# Patient Record
Sex: MALE | Race: WHITE | Employment: FULL TIME | URBAN - METROPOLITAN AREA
[De-identification: names, ages, dates, MRNs, and addresses within clinical notes are randomized per-mention and may not be internally consistent; named-entity substitution may affect disease eponyms.]

---

## 2019-09-23 PROBLEM — E66.01 CLASS 3 SEVERE OBESITY DUE TO EXCESS CALORIES WITH BODY MASS INDEX (BMI) GREATER THAN OR EQUAL TO 70 IN ADULT (HCC): Status: ACTIVE | Noted: 2019-09-23

## 2019-09-23 PROBLEM — K80.50 BILIARY COLIC: Status: ACTIVE | Noted: 2019-09-23

## 2020-11-05 PROBLEM — I60.9 SUBARACHNOID BLEED (HCC): Status: ACTIVE | Noted: 2020-11-05

## 2020-11-05 PROBLEM — R51.9 INTRACTABLE HEADACHE: Status: ACTIVE | Noted: 2020-11-05

## 2021-05-19 ENCOUNTER — OFFICE VISIT (OUTPATIENT)
Dept: SURGERY | Age: 41
End: 2021-05-19
Payer: MEDICAID

## 2021-05-19 ENCOUNTER — OFFICE VISIT (OUTPATIENT)
Dept: SURGERY | Age: 41
End: 2021-05-19

## 2021-05-19 VITALS
BODY MASS INDEX: 44.1 KG/M2 | HEIGHT: 71 IN | RESPIRATION RATE: 18 BRPM | WEIGHT: 315 LBS | OXYGEN SATURATION: 99 % | DIASTOLIC BLOOD PRESSURE: 80 MMHG | SYSTOLIC BLOOD PRESSURE: 122 MMHG | HEART RATE: 90 BPM | TEMPERATURE: 98.1 F

## 2021-05-19 DIAGNOSIS — E66.01 MORBID OBESITY (HCC): Primary | ICD-10-CM

## 2021-05-19 DIAGNOSIS — E66.01 CLASS 3 SEVERE OBESITY DUE TO EXCESS CALORIES WITH SERIOUS COMORBIDITY AND BODY MASS INDEX (BMI) GREATER THAN OR EQUAL TO 70 IN ADULT (HCC): ICD-10-CM

## 2021-05-19 DIAGNOSIS — G47.30 SLEEP DISORDER BREATHING: ICD-10-CM

## 2021-05-19 DIAGNOSIS — J45.909 UNCOMPLICATED ASTHMA, UNSPECIFIED ASTHMA SEVERITY, UNSPECIFIED WHETHER PERSISTENT: ICD-10-CM

## 2021-05-19 DIAGNOSIS — F12.90 MARIJUANA USE: ICD-10-CM

## 2021-05-19 DIAGNOSIS — E66.01 MORBID OBESITY WITH BMI OF 70 AND OVER, ADULT (HCC): ICD-10-CM

## 2021-05-19 DIAGNOSIS — M19.90 ARTHRITIS: ICD-10-CM

## 2021-05-19 DIAGNOSIS — G93.2 PSEUDOTUMOR CEREBRI: ICD-10-CM

## 2021-05-19 DIAGNOSIS — I10 ESSENTIAL HYPERTENSION: ICD-10-CM

## 2021-05-19 DIAGNOSIS — G89.29 OTHER CHRONIC PAIN: ICD-10-CM

## 2021-05-19 PROCEDURE — 99205 OFFICE O/P NEW HI 60 MIN: CPT | Performed by: SPECIALIST

## 2021-05-19 NOTE — PROGRESS NOTES
Bariatric Surgery Consultation    Subjective: The patient is a 41 y.o. obese male with a Body mass index is 75.18 kg/m². .  The patient is at his heaviest weight for the past several years. he has been overweight since childhood. he has been considering surgery since several years. he desires surgery at this time because of multiple health concerns and their lifestyle issues which are hindered by their weight. he has been referred by his family physician Dr Kelsea Major for evaluation and treatment of their obesity via surgical intervention. Breanne Soto has tried multiple diets in his lifetime most recently tried physician supervised, behavior modification, unsupervised diets and liraglutide    Bariatric comorbidities present are   Patient Active Problem List   Diagnosis Code    Biliary colic O44.30    Class 3 severe obesity due to excess calories with body mass index (BMI) greater than or equal to 70 in adult ([facility] [address] E66.01, Z68.45    Morbid obesity ([facility] [address] E66.01    Thunderclap headache G44.53    Cocaine use F14.90    Marijuana use F12.90    Hypertensive emergency requring acute intensive management I16.1    Asthma J45.909    Morbid obesity with BMI of 70 and over, adult ([facility] [address] E66.01, Z68.45    Hypertension I10    Arthritis M19.90    Chronic pain G89.29    Pseudotumor cerebri G93.2    Sleep disorder breathing G47.30       The patient is considering laparoscopic gastric bypass surgery for surgical weight loss due to their ineffective progress with medical forms of weight loss and the urging of their physician who cares for their primary medical issues. The patient  now presents  for consideration for weight loss surgery understanding the benefits of this over a medical approach of weight loss as was discussed in our presentation on weight loss surgery. They have discussed their plans both with their family and primary care physician who is in support of their pursuit of such.  The patient has not had health issues as of late and denies and gastrointestinal disturbances other than what is outlined below in their review of symptoms. All of their prior evaluations available by both their PCP's and specialists physicians have been reviewed today either in the Care Everywhere portal or scanned under the media tab. I have reviewed the patient's chart in care everywhere and he has a relationship with Dr. Piotr Snyder at [facility] for bariatric surgery. Apparently Dr. Piotr Snyder removed his gallbladder sometime within the past year or 2. The patient underwent a medical weight loss program at that institution and apparently had significant problems with compliance as it pertains to his follow-up. He was even released for 3 months from the program due to his noncompliance. In my evaluation of the patient's history today he was never referred for sleep study despite his massively high BMI of 76. Also his expectation is that he will be able to have a gastric bypass procedure as a first-line treatment of his morbid obesity. I have spent a large portion of my initial consultation today reviewing the patients current dietary habits which have contributed to their health issues and obesity. I have suggested to them personally a dietary regimen that they can initiate now to help with their status as it pertains to their weight. They understand that the most important aspect of their journey through their weight loss endeavor will be their adherence to a new lifestyle of healthy eating behavior. They also understand that an adherence to an exercise program will not only help with weight loss but is ultimately important in weight maintenance. The patients goal weight is 250lb. These goals are consistent with expected outcomes of their desired operation if the patient is successful at a significant preoperative weight loss phase.   This would then include a first stage sleeve gastrectomy and a second stage gastric bypass procedure likely at a later date to some time after a 2-year weight loss endeavor with the sleeve procedure. .  his Medical goals are resolution of these health issues.       Patient Active Problem List    Diagnosis Date Noted    Morbid obesity with BMI of 70 and over, adult ([address]     Hypertension     Arthritis     Chronic pain     Pseudotumor cerebri     Sleep disorder breathing     Morbid obesity ([facility] [address]     Thunderclap headache     Cocaine use     Marijuana use     Hypertensive emergency requring acute intensive management     Asthma     Biliary colic 66/21/3080    Class 3 severe obesity due to excess calories with body mass index (BMI) greater than or equal to 70 in adult [facility]) 09/23/2019      Past Surgical History:   Procedure Laterality Date    HX HEENT      VOCAL CORDS AND FOLDS FOR CANCER CELLS    HX WRIST FRACTURE TX Right     [place] [facility] 13 N/A 11/5/2020    2 Cta Head (65691) performed by Lexie Cisse MD at [facility] [address] N/A 11/5/2020    Lumbar Puncture Diagnostic Csf (26056) performed by Lexie Cisse MD at [address] CATH CAROTID/INNOM ART ANGIO INTRCRANL ART N/A 11/5/2020    Angiography Cerebral Common Carotid Right/Innominate (89488) performed by Lexie Cisse MD at [address] CATH CAROTID/INNOM ART ANGIO INTRCRANL ART N/A 11/5/2020    Angiography Cerebral Common Carotid Left/Innominate (50910) performed by Lexie Cisse MD at [address] CATH VERTEBRAL ART ANGIO VERTEBRAL ARTERY N/A 11/5/2020    Angiography Cervicocerebral Vertebral Right (33122) performed by Lexie Cisse MD at [address], [address] N/A 11/5/2020    Ultrasound Guided Vascular Access (07806) performed by Lexie Cisse MD at [address] [facility]    Smoking status: Former Smoker     Packs/day: 1.50     Quit date: 3/1/2017     Years since quittin.2    Smokeless tobacco: Current User   Substance Use Topics    Alcohol use: Not Currently      Family History   Problem Relation Age of Onset    Hypertension Mother         PULMONARY    Hypertension Father         PULMONARY      Current Outpatient Medications   Medication Sig Dispense Refill    verapamiL (CALAN) 40 mg tablet Take 1 Tab by mouth two (2) times a day. 60 Tab 2    citalopram (CeleXA) 10 mg tablet Take  by mouth daily.  liraglutide (VICTOZA) 0.6 mg/0.1 mL (18 mg/3 mL) pnij 0.6 mg by SubCUTAneous route.  budesonide-formoterol (SYMBICORT) 80-4.5 mcg/actuation HFAA Take 2 Puffs by inhalation two (2) times a day.  albuterol (PROVENTIL HFA, VENTOLIN HFA, PROAIR HFA) 90 mcg/actuation inhaler Take  by inhalation two (2) times daily as needed for Wheezing.  gabapentin (NEURONTIN) 800 mg tablet Take 800 mg by mouth four (4) times daily.  meloxicam (MOBIC) 7.5 mg tablet Take 7.5 mg by mouth two (2) times a day. Indications: joint damage causing pain and loss of function, rheumatoid arthritis      aspirin delayed-release 81 mg tablet Take 81 mg by mouth daily.        Allergies   Allergen Reactions    Bactrim [Sulfamethoprim] Hives          Review of Systems:            General - No history or complaints of unexpected fever, chills, or weight loss  Head/Neck - No history or complaints of headache, diplopia, dysphagia, hearing loss  Cardiac - No history or complaints of chest pain, palpitations, murmur, or shortness of breath  Pulmonary - No history or complaints of shortness of breath, productive cough, hemoptysis  Gastrointestinal - has reflux noted which is controlled with PPI,no  abdominal pain, obstipation/constipation or blood per rectum  Genitourinary - No history or complaints of hematuria/dysuria, stress urinary incontinence symptoms, or renal lithiasis  Musculoskeletal - has joint pain in their knees,  no muscular weakness  Hematologic - No history or complaints of bleeding disorders,  No blood transfusions  Neurologic - No history or complaints of  migraine headaches, seizure activity, syncopal episodes, TIA or stroke  Integumentary - No history or complaints of rashes, abnormal nevi, skin cancer             Objective:     Visit Vitals  /80 (BP 1 Location: Right arm, BP Patient Position: Sitting, BP Cuff Size: Adult)   Pulse 90   Temp 98.1 °F (36.7 °C)   Resp 18   Ht 5' 11\" (1.803 m)   Wt (!) 244.5 kg (539 lb)   SpO2 99%   BMI 75.18 kg/m²       Physical Examination: General appearance -generally patient looks somewhat out of breath. He is also very loud when he speaks  Mental status - alert, oriented to person, place, and time, agitated  Eyes - pupils equal and reactive, extraocular eye movements intact, sclera anicteric, left eye normal, right eye normal  Ears - right ear normal, left ear normal  Nose - normal and patent, no erythema, discharge or polyps  Mouth - mucous membranes moist, pharynx normal without lesions  Neck - supple, no significant adenopathy, massive neck circumference is noted  Lymphatics - no palpable lymphadenopathy, no hepatosplenomegaly  Chest - clear to auscultation,  very distant breath sounds noted, the patient became very short of breath when lying flat  Heart - normal rate, regular rhythm, normal S1, S2, no murmurs, rubs, clicks or gallops  Abdomen -massive, severe, central obesity noted with total inability to palpate rib margins.   Back exam - full range of motion, no tenderness, palpable spasm or pain on motion  Neurological - alert, oriented, normal speech, no focal findings or movement disorder noted  Musculoskeletal - no joint tenderness, deformity or swelling  Extremities - peripheral pulses normal, moderate edema with venous stasis changes, no clubbing or cyanosis  Skin - normal coloration and turgor, no rashes, no suspicious skin lesions noted    Labs:     Lab Results   Component Value Date/Time    WBC 8.5 03/06/2021 11:09 AM    HGB 15.0 03/06/2021 11:09 AM    HCT 44.9 03/06/2021 11:09 AM    PLATELET 755 97/08/5764 11:09 AM    MCV 89.6 03/06/2021 11:09 AM     Lab Results   Component Value Date/Time    Sodium 140 03/06/2021 11:09 AM    Potassium 3.9 03/06/2021 11:09 AM    Chloride 106 03/06/2021 11:09 AM    CO2 27 03/06/2021 11:09 AM    Anion gap 7 03/06/2021 11:09 AM    Glucose 78 03/06/2021 11:09 AM    BUN 18 03/06/2021 11:09 AM    Creatinine 1.1 03/06/2021 11:09 AM    GFR est AA >60 03/06/2021 11:09 AM    GFR est non-AA >60 03/06/2021 11:09 AM    Calcium 8.8 03/06/2021 11:09 AM    Bilirubin, total 0.5 03/06/2021 11:09 AM    Alk. phosphatase 88 03/06/2021 11:09 AM    Protein, total 8.4 (H) 03/06/2021 11:09 AM    Albumin 3.6 03/06/2021 11:09 AM    ALT (SGPT) 33 03/06/2021 11:09 AM     Lab Results   Component Value Date/Time    Iron 77 03/06/2021 11:09 AM    Ferritin 73.4 03/06/2021 11:09 AM     No results found for: FOL, RBCF  Lab Results   Component Value Date/Time    Vitamin D, 25-OH, Total 27.0 (L) 03/06/2021 11:09 AM           Had elevated H pylori IgA 11.8 3/6/21        Assessment:     Morbid obesity with associated comorbidity    Plan:     Laparoscopic sleeve gastrectomy likely followed by a gastric bypass procedure at 2 years out from his surgery    This is a 41 y.o. male with a BMI of Body mass index is 75.18 kg/m². and the weight-related co-morbidties. Pascual Green meets the NIH criteria for bariatric surgery based upon the BMI of Body mass index is 75.18 kg/m². and multiple weight-related co-morbidties.  Pascual Green has elected laparoscopic gastric bypass as his intervention of choice for treatment of morbid obestiy through surgical means secondary to its uniform results,  profound baseline suppression of hunger and pace at which weight is lost.    In the office today, following Bernard's history and physical examination, a 30 minute discussion regarding the anatomic alterations for the laparoscopic gastric bypass  was undertaken. The dietary expectations and the patient  dependent factors for success were thoroughly discussed, to include the need for interval follow-up and long-term dietary changes associated with success. The possible short and long term  complications of the gastric bypass were also discussed, to include but not limited to;death, DVT/PE, staple line leak, bleeding, stricture formation, infection,internal hernia  and pouch dilation. Specific weight related outcomes for success were also discussed with an emphasis on careful and close follow-up with the first year and dietary behavior modification over the first years as baseline cyclical hunger returns  The patient expressed an understanding of the above factors, and his questions were answered in their entirety. In addition, the patient watched a 1.5 hour power point seminar regarding obesity, surgical weight loss including, adjustable gastric band, gastric bypass, and sleeve gastrectomy. This discussion contrasted the different surgical techniques, mechanisms of actions and expected outcomes, and surgical and medical risks associated with each procedure. Today, the patient had all of his questions answered . The patient has expectations related to his surgery which are impossible to achieve that his current weight. I spent a considerable amount of time discussing his very significant health issues that have gone on for quite some time. I have also explained to him that it would be impossible even to have him tolerate an anesthesia at his current size. I have also explained to him that he must have a sleep study to obtain the diagnosis of sleep apnea and be established on his CPAP machine prior to any surgical intervention. He has pushed back quite a bit today stating that he had his gallbladder out with no issues.   I explained to him that bariatric surgery is totally elective and we must obtain the diagnosis of sleep apnea and he must lose weight preoperatively in order to proceed to the operating suite to address his super morbid obese status. I explained to him that my preference would be for him to reduce his weight by at least 50 to 60 pounds prior to any attempt at weight loss surgery in order to make his surgical procedure much safer. Secondary Diagnoses:     Dietary Intervention  - The patient is currently scheduled to see or has been followed by a bariatric nutritionist for an attempt at preoperative weight loss as has been dictated by their insurance carrier. They will be assessed at various times during their follow up to evaluate their progress depending on the length of time that is required once again by their carrier. I have explained the importance of preoperative weight loss and the benefits regarding lower surgical risk and also assisting the patient in reaching their weight loss goal.  Finally they understand there is a physiologic benefit from the standpoint of hepatic volume reduction and reduction of central visceral adiposity preoperatively. I have reiterated the importance of a low carbohydrate and high protein regimen to achieve their stated goal. I have reviewed their current eating behavior prior to this encounter and explained to them in an exhaustive fashion the appropriate diet that they should adhere to. They have been encouraged to loose weight pre operatively and understand it is our prerogative to cancel surgery or postpone their procedure in the event of significant weight gain. The patients weight loss goal pre operatively is 50-60 pounds. Pseudotumor cerebri - The pt has a known diagnosis of PTC & is under the care of her neurologist.  The patient understands that this is a classic co-morbidity of obesity and should improve with weight loss. All home rx related to the tx of PTC will be resumed 2-4 weeks post-op.   All changes in medication therapy will be made by the patient's neurologist    Restrictive Airway Disease - We will continue all of their pulmonary medications in the form of oral pills and inhalers in both the perioperative and postoperative period. They understand that their symptoms should improve with weight loss. Any further testing related to this will be turned over to their family physician or pulmonologist. The patient understands that if they require oral or IV steroids in the future that they will notify us. This is particularly important for gastric bypass patients at all times and both sleeve gastrectomy and gastric bypass patients in the 1 month pre op and 1 month post operative period. They understand that inhaled steroids are exempt from this. Weight Related Arthritis -The patient understands the benefits that weight loss surgery can have on their arthritis but also understands that weight loss is not a guaranteed cure and relief of symptoms is often dependent on the severity of the underlying disease. The patient also understands that traditional pharmaceutical treatments for this diagnosis are usually unavailable to post-operative weight loss patients due to the effects on the gastrointestinal tract. Any changes to the patients medication treatment will ultimately be made the patients PCP with input by our office. GERD -The patient understands that weight loss surgery is not a guaranteed cure for reflux disease but does understand the benefits that weight loss can have on reflux disease. They also understand that at the time of surgery the gastroesophageal junction will be evaluated for the presence of a diaphragmatic hernia. Hernias will be corrected always with the gastric band and sleeve gastrectomy procedures, but only on a case by case basis with the gastric bypass.   The patient also understands that neither weight loss surgery nor repair of a diaphragmatic hernia repair guarantees the complete cessation of the disease. Hypertension - The patient has a clear understanding of how weight loss improves hypertension as a whole, but also they understand that there is a significant genetic component to this disease process. We will monitor the patients blood pressure while in the hospital and the plan would be to continue those medications postoperatively.  If a diuretic is being used we will stop them on discharge to prevent dehydration particularly with the sleeve gastrectomy and the gastric bypass procedures.  They will be instructed to monitor their blood pressure postoperatively while at home and notify their primary care physician in the event of any significantly high or uncharacteristic readings. Obstructive Sleep Apnea -The patient understands the association of sleep apnea and obesity and the additional risk that it caries related to post surgical complications. If they have not been tested for sleep apnea and I feel they are at increased risk for this diagnosis, then they will be scheduled for a consultation with a Pulmonologist for such. In the event that they joseph this diagnosis we will have the patient bring their CPAP machine to the hospital for use both postoperatively in the PACU and on the floor at its appropriate setting.  We will have them continue using it while at home after surgery and follow up with their pulmonologist 6 months after to be retested to see if it can be discontinued at that time period. We will send the patient to [place] [id number] for a sleep evaluation prior to proceeding with any surgical intervention. Smoking Cessation - Today I have counseled the patient extensively regarding smoking cessation for greater than 10 minutes. They have been counseled extensively about the detrimental effects of smoking on their weight loss surgical procedure particularly for the gastric bypass and sleeve gastrectomy procedures.   They understand that smoking leads to pulmonary issues postoperatively and can lead to gastric ulcers and marginal ulcers in the post bariatric surgery pouch that has been created. They understand that they must stop smoking 1 month at least prior to surgery or it may affect their ultimate progression to their procedure. They understand finally that labs may be obtained to prove that they have ceased smoking prior to surgery. Total time counseling was greater than 10 minutes. Patient understands that he must stop consuming any and all nicotine or THC based substances prior to surgical procedure.       Signed By: Cooper Morillo MD     May 23, 2021

## 2021-05-20 VITALS — WEIGHT: 315 LBS | BODY MASS INDEX: 44.1 KG/M2 | HEIGHT: 71 IN

## 2021-05-20 NOTE — PROGRESS NOTES
Medical Weight Loss Multi-Disciplinary Program    Name: Rhesa Dakins   : 1980    Session# 1   Pt attended in-person class. Weight obtained in office. Date: 2021    Visit Vitals  Ht 5' 11\" (1.803 m)   Wt (!) 245.4 kg (541 lb)   BMI 75.45 kg/m²         Dietary Instructions    Reviewed intake  Understanding low carbohydrates, low sugar, higher protein meals  Instruction given for personal dietary changes  Discussed perceived compliance  Comments: RD Reviewed Diet History and Physical Activity/Exercise habits. Recommended dietary changes discussed for both before and after surgery. Reviewed recommendation to follow [id number] calorie diet, working to reduce total carbohydrate intake to  g or less per day and increasing protein intake to  g per day, compared current intake to recommendations. Recommend pt adopt an exercise routine of at least 3-5 days a week for at least 30 minutes/day. If pt unable to participate in walking, sherly, swimming, or other exercises, recommend pt work with a physical therapist and/or participate in chair exercises, yoga, and/or increase activities of daily living as able. Patient participated in pre-recorded education class video. Recorded video of dietitian discussing key diet principles. Reviewed importance of small structured meals, adequate hydration,  food and fluid, supplementation of vitamins/minerals and protein shakes, also reviewed recommendations for physical activity. Patient has previously received pre-operative education packet that reviewed these topics, but discussed in details the role of dietary and behavior changes to promote optimal long term weight loss following bariatric surgery. Patient watched the video in its entirety and turned in the 3 embedded passcodes from the video session.  Pt completed additional \"homework\" for this visit's session, including a food recall, physical activity summary, and additional nutrition-related responses to questionnaire. Behavior Modification    Identify obstacles to trigger change  Achieving/Rewarding goals met  Positive attitude  Comments: Reinforced importance continuing to modify lifestyle patterns and behaviors to promote weight loss and long term weight maintenance     Comments:  During today's lesson discussed post-operative key diet principles and reviewed dietary and behavior changes to begin making now. Pt completed Bariatric-specific nutrition questionnaire. RD reviewed answers and provided feedback on responses that align with bariatric recommendations to behavior changes. Provided feedback on recommendations, areas for improvement, and provided positive feedback on areas where pt is making positive behavior changes. Pt questionnaire is included in chart separate from this note. Physical Activity/Exercise    Discussed Perceived Compliance  Motivation    Patient has been educated on the importance of a physical activity regimen, reinforced the importance of regular physical activity for total health and weight management. Suggested starting or continuing exercise regimen of cardiovascular and resistance training for at least 3-5 days per week for 30-60 minutes. Recommend pt track weekly progress and adherence to recommendations. Goals:   1. Work to increase to 3-4 small meals per day, with planned snacks as needed. Recommend following plate method for meal planning - focusing on lean protein, non-starchy vegetables, and measured amounts of starch. - Goal of  g protein and  g carbohydrate per day. - Recommend continuing protein supplement as meal replacement at least 1x/day OR as high protein snack option  2. Increase non caloric fluid to 64 oz per day.   Eliminate caffeine, added sugar, carbonation, and straws.               -Continue to work to decrease sugar sweetened beverages - goal of calorie free beverages only -Must eliminate caffeine prior to surgery and avoid for ~6-8 weeks   -Practice 30:30 rule,  food and flood   3. Start activity regimen, work to increase ADL  4. Start Complete MVI    Candidate for surgery (per RD):  PENDING

## 2021-06-17 ENCOUNTER — OFFICE VISIT (OUTPATIENT)
Dept: SURGERY | Age: 41
End: 2021-06-17

## 2021-06-17 ENCOUNTER — VIRTUAL VISIT (OUTPATIENT)
Dept: SURGERY | Age: 41
End: 2021-06-17
Payer: MEDICAID

## 2021-06-17 VITALS — HEIGHT: 71 IN | BODY MASS INDEX: 44.1 KG/M2 | WEIGHT: 315 LBS

## 2021-06-17 DIAGNOSIS — E66.01 MORBID OBESITY (HCC): Primary | ICD-10-CM

## 2021-06-17 DIAGNOSIS — I10 ESSENTIAL HYPERTENSION: ICD-10-CM

## 2021-06-17 DIAGNOSIS — G47.30 SLEEP DISORDER BREATHING: Primary | ICD-10-CM

## 2021-06-17 DIAGNOSIS — E66.01 MORBID OBESITY WITH BMI OF 70 AND OVER, ADULT (HCC): ICD-10-CM

## 2021-06-17 DIAGNOSIS — G93.2 PSEUDOTUMOR CEREBRI: ICD-10-CM

## 2021-06-17 PROCEDURE — 99214 OFFICE O/P EST MOD 30 MIN: CPT | Performed by: NURSE PRACTITIONER

## 2021-06-17 RX ORDER — ACETAZOLAMIDE 500 MG/1
CAPSULE, EXTENDED RELEASE ORAL
COMMUNITY

## 2021-06-17 NOTE — PROGRESS NOTES
Bariatric Surgery Consultation    Subjective:     Zacarias Hurtado is a 41 y.o. obese male with a Body mass index is 73.78 kg/m². Ulus Reusing Zacarias Hurtado desires surgery at this time because of health issues and quality of life issues. Zacarias Hurtado has been seen by a bariatric nutritionist and has been placed on an appropriate low carbohydrate diet. The patient desires laparoscopic sleeve gastrectomy for surgical weight loss, however he is not currently a surgical candidate due to pending work up. Zacarias Hurtado is here today to check progress with weight loss / evaluate nutritional status and review all subspecialty clearances in hopes of proceeding to the operating room. Office visit notes from May 2021 to present have been reviewed. Zacarias Hurtado has increased fluid intake, is focusing on protein, is eating regularly, is taking a multivitamin & has increased his activity. No recent visits with PCP. No new medications. States he has been 100% nicotine free for several years and not had any edible THC in several months. No ER visits or hospitalizations. He initially started his bariatric surgery criteria process with Dr Amandeep Montoya and has transferred his care to us. He weighed 562 lbs in a March 2021 note from Dr. Amandeep Montoya, and has lost 33 lbs since that time.     Patient Active Problem List    Diagnosis Date Noted    Morbid obesity with BMI of 70 and over, adult ([address]     Hypertension     Arthritis     Chronic pain     Pseudotumor cerebri     Sleep disorder breathing     [facility]bid [facility] [address]     Thunderclap headache     Cocaine use     Marijuana use     Hypertensive emergency requring acute intensive management     Asthma     Biliary colic 12/08/2370    Class 3 severe obesity due to excess calories with body mass index (BMI) greater than or equal to 70 in adult [facility]) 09/23/2019      Past Surgical History:   Procedure Laterality Date    HX HEENT      VOCAL CORDS AND FOLDS FOR CANCER CELLS    HX WRIST FRACTURE TX Right     [place] CT Kannan 13 N/A 2020    2 Cta Head (76802) performed by Ann Monroy MD at [facility] 61 N/A 2020    Lumbar Puncture Diagnostic Csf (24173) performed by Ann Monroy MD at [address] CATH CAROTID/INNOM ART ANGIO INTRCRANL ART N/A 2020    Angiography Cerebral Common Carotid Right/Innominate (18413) performed by Ann Monroy MD at [address] CATH CAROTID/INNOM ART ANGIO INTRCRANL ART N/A 2020    Angiography Cerebral Common Carotid Left/Innominate (48913) performed by Ann Monroy MD at [address] CATH VERTEBRAL ART ANGIO VERTEBRAL ARTERY N/A 2020    Angiography Cervicocerebral Vertebral Right (42737) performed by Ann Monroy MD at [address] [place] [address] Ave, VASCULAR ACCESS N/A 2020    Ultrasound Guided Vascular Access (55338) performed by Ann Monroy MD at [facility]3] 44      Social History     Tobacco Use    Smoking status: Former Smoker     Packs/day: 1.50     Quit date: 3/1/2017     Years since quittin.2    Smokeless tobacco: Current User   Substance Use Topics    Alcohol use: Not Currently      Family History   Problem Relation Age of Onset    Hypertension Mother         PULMONARY    Hypertension Father         PULMONARY      Current Outpatient Medications   Medication Sig Dispense Refill    acetaZOLAMIDE SR (DIAMOX) 500 mg capsule acetazolamide  mg capsule,extended release   TAKE 1 CAPSULE BY MOUTH TWICE DAILY FOR 30 DAYS      verapamiL (CALAN) 40 mg tablet Take 1 Tab by mouth two (2) times a day. 60 Tab 2    citalopram (CeleXA) 10 mg tablet Take  by mouth daily.  liraglutide (VICTOZA) 0.6 mg/0.1 mL (18 mg/3 mL) pnij 0.6 mg by SubCUTAneous route.       budesonide-formoterol (SYMBICORT) 80-4.5 mcg/actuation HFAA Take 2 Puffs by inhalation two (2) times a day.  albuterol (PROVENTIL HFA, VENTOLIN HFA, PROAIR HFA) 90 mcg/actuation inhaler Take  by inhalation two (2) times daily as needed for Wheezing.  gabapentin (NEURONTIN) 800 mg tablet Take 800 mg by mouth four (4) times daily.  meloxicam (MOBIC) 7.5 mg tablet Take 7.5 mg by mouth two (2) times a day. Indications: joint damage causing pain and loss of function, rheumatoid arthritis      aspirin delayed-release 81 mg tablet Take 81 mg by mouth daily.        Allergies   Allergen Reactions    Bactrim [Sulfamethoprim] Hives          Review of Systems:        General - No history or complaints of unexpected fever, chills, or weight loss  Head/Neck - No history or complaints of headache, diplopia, dysphagia, hearing loss  Cardiac - No history or complaints of chest pain, palpitations, murmur, or shortness of breath  Pulmonary - No history or complaints of shortness of breath, productive cough, hemoptysis  Gastrointestinal - has reflux which is controlled with PPI,  abdominal pain, obstipation/constipation, blood per rectum  Genitourinary - No history or complaints of hematuria/dysuria, stress urinary incontinence symptoms, or renal lithiasis  Musculoskeletal - has joint pain in his knees, no muscular weakness  Hematologic - No history or complaints of bleeding disorders, blood transfusions, sickle cell anemia  Neurologic - No history or complaints of  migraine headaches, seizure activity, syncopal episodes, TIA or stroke  Integumentary - No history or complaints of rashes, abnormal nevi, skin cancer      Objective:     Visit Vitals  Ht 5' 11\" (1.803 m)   Wt (!) 240 kg (529 lb)   BMI 73.78 kg/m²       Physical Exam:    General appearance - well appearing and in no distress  Mental status - alert, oriented to person, place, and time  Pulmonary - normal respiratory effort  Abdomen - no obvious distention  Neurological - normal speech, no focal findings or movement disorder noted  Extremities - normal movement  Musculoskeletal - moving extremities without difficulty  Skin - no rashes, no suspicious skin lesions noted      Labs:     No results found for this or any previous visit (from the past 2016 hour(s)). Assessment:     Morbid obesity with associated comorbidity of hypertension, severe central obesity & outstanding insurance requirements. Plan: To continue current medications & routine follow-up with PCP. Continuation of Pre-Operative evaluation / clearance:  Kaitlin Knight has returned to the office today to discuss his status as a surgical candidate. Progress has been noted and reviewed - including review of notes from dietician. Katilin Knight is being compliant with follow-up & recommendations. Kaitlin Knight has 17-27 more pounds to lose before proceeding to the OR. (10 pounds lost since last visit)  Kaitlin Knight has 1 more nutritional visits to complete before proceeding to the OR. Additionally, 1 more medical visits are required. Kaitlin Knight has an outstanding psychological, pulmonary and PCP clearance to review before proceeding to the OR. Kaitlin Knight will return in 1 month to continue the pre-operative process and to work towards goals as outlined. Kaitlin Knight understand the rationales for all the above and plans to follow the diet & activity recommendations of the dietician. It has been discussed that given his morbidly obese condition that the best surgical option for this patient would be the laparoscopic sleeve gastrectomy. Kaitlin Knight agrees with the surgical choice and has been educated in it's; risks, benefits, and alternatives. We will continue with the pre-operative evaluation as needed to check progress.     Patient is a planned as a first stage sleeve gastrectomy and a second stage gastric bypass procedure likely  after a 2-year weight loss endeavor with the sleeve procedure    Secondary Diagnoses:     Dietary Intervention  - The patient is currently followed by a bariatric nutritionist for an attempt at preoperative weight loss as has been dictated by their insurance carrier. They will be assessed at various times during their follow up to evaluate their progress depending on the length of time that is required once again by their carrier. I have explained the importance of preoperative weight loss and the benefits regarding lower surgical risk and also assisting the patient in reaching their weight loss goal.  Finally they understand there is a physiologic benefit from the standpoint of hepatic volume reduction preoperatively. I have reiterated the importance of a low carbohydrate and high protein regimen to achieve their stated goal.The patients weight loss goal pre operatively is 50-60 pounds. Pseudotumor cerebri - The pt has a known diagnosis of PTC & is under the care of her neurologist.  The patient understands that this is a classic co-morbidity of obesity and should improve with weight loss. All home rx related to the tx of PTC will be resumed 2-4 weeks post-op. All changes in medication therapy will be made by the patient's neurologist     Restrictive Airway Disease - We will continue all of their pulmonary medications in the form of oral pills and inhalers in both the perioperative and postoperative period. They understand that their symptoms should improve with weight loss. Any further testing related to this will be turned over to their family physician or pulmonologist. The patient understands that if they require oral or IV steroids in the future that they will notify us. This is particularly important for gastric bypass patients at all times and both sleeve gastrectomy and gastric bypass patients in the 1 month pre op and 1 month post operative period.  They understand that inhaled steroids are exempt from this.     Weight Related Arthritis -The patient understands the benefits that weight loss surgery can have on their arthritis but also understands that weight loss is not a guaranteed cure and relief of symptoms is often dependent on the severity of the underlying disease. The patient also understands that traditional pharmaceutical treatments for this diagnosis are usually unavailable to post-operative weight loss patients due to the effects on the gastrointestinal tract. Any changes to the patients medication treatment will ultimately be made the patients PCP with input by our office.     GERD -The patient understands that weight loss surgery is not a guaranteed cure for reflux disease but does understand the benefits that weight loss can have on reflux disease. They also understand that at the time of surgery the gastroesophageal junction will be evaluated for the presence of a diaphragmatic hernia. Hernias will be corrected always with the gastric band and sleeve gastrectomy procedures, but only on a case by case basis with the gastric bypass. The patient also understands that neither weight loss surgery nor repair of a diaphragmatic hernia repair guarantees the complete cessation of the disease.      Hypertension - The patient has a clear understanding of how weight loss improves hypertension as a whole, but also they understand that there is a significant genetic component to this disease process.  We will monitor the patients blood pressure while in the hospital and the plan would be to continue those medications postoperatively.  If a diuretic is being used we will stop them on discharge to prevent dehydration particularly with the sleeve gastrectomy and the gastric bypass procedures.  They will be instructed to monitor their blood pressure postoperatively while at home and notify their primary care physician in the event of any significantly high or uncharacteristic readings.     Obstructive Sleep Apnea -The patient understands the association of sleep apnea and obesity and the additional risk that it caries related to post surgical complications. If they have not been tested for sleep apnea and I feel they are at increased risk for this diagnosis, then they will be scheduled for a consultation with a Pulmonologist for such. In the event that they joseph this diagnosis we will have the patient bring their CPAP machine to the hospital for use both postoperatively in the PACU and on the floor at its appropriate setting.  We will have them continue using it while at home after surgery and follow up with their pulmonologist 6 months after to be retested to see if it can be discontinued at that time period. We will send the patient to [facility]. for a sleep evaluation prior to proceeding with any surgical intervention. Mr. Luba Sauceda has a reminder for a \"due or due soon\" health maintenance. I have asked that he contact his primary care provider for follow-up on this health maintenance. This visit with Mr Luba Sauceda was performed under virtual telemedicine guidelines during the coronavirus (LVLBA-97) public health emergency on 6/17/21 in an interactive fashion using [employer]. me. They understand that this telemedicine encounter is a billable service, with coverage determined by their insurance carrier. They are aware that they may receive a bill and have provided verbal consent for this virtual visit. This visit was performed with the patient in their home environment and provider was present at [facility]. I have spent over 30 minutes on this visit  both prior to the visit reviewing the patients chart and with the patient face to face. I have reviewed their medical history, performed a telemedicine physical examination, and discussed the plan of action to date.   They understand that they will be asked to come to the office when our office is allowed normal patient interaction, as dictated by public health officials, for a face-to-face visit to redluisuss all of the things we have talked about today.         Signed By: Romayne Ireland, RUBEN     June 17, 2021

## 2021-06-21 VITALS — HEIGHT: 71 IN | WEIGHT: 315 LBS | BODY MASS INDEX: 44.1 KG/M2

## 2021-06-21 NOTE — PROGRESS NOTES
Medical Weight Loss Multi-Disciplinary Program    Name: Bartolome Walter   : 1980    Session# 5  Pt attended in-person class. Weight obtained in office. Date: 2021    Visit Vitals  Ht 5' 11\" (1.803 m)   Wt (!) 240.3 kg (529 lb 12.8 oz)   BMI 73.89 kg/m²       Pt has lost/gained  11.2 lbs since last visit on 21. Dietary Instructions    Reviewed intake  Understanding low carbohydrates, low sugar, higher protein meals  Instruction given for personal dietary changes  Discussed perceived compliance  Comments: RD Reviewed Diet History and Physical Activity/Exercise habits. Recommended dietary changes discussed for both before and after surgery. Reviewed recommendation to follow [id number] calorie diet, working to reduce total carbohydrate intake to  g or less per day and increasing protein intake to  g per day, compared current intake to recommendations. Recommend pt adopt an exercise routine of at least 3-5 days a week for at least 30 minutes/day. If pt unable to participate in walking, sherly, swimming, or other exercises, recommend pt work with a physical therapist and/or participate in chair exercises, yoga, and/or increase activities of daily living as able. Patient participated in pre-recorded education class video. Recorded video of dietitian discussing key diet principles and reviewing recommendations preparing for bariatric surgery. Reviewed importance of small structured meals, adequate hydration,  food and fluid, supplementation of vitamins/minerals following surgery. Reviewed tips and recommendations for tolerance and behavior modifications to begin initiating now. Patient has previously received pre-operative education packet that reviewed these topics, but discussed in details the role of dietary and behavior changes to promote optimal long term weight loss following bariatric surgery.      Patient watched the video in its entirety and turned in the 3 embedded passcodes from the video session. Pt submitted bariatric quiz which requires at least 80% pass-rate for surgical approval from RD. Pt also submitted \"homework\" from this videos session - answering nutrition and exercise questions related to their behavior changes, goals, and the recommendations. Behavior Modification    Identify obstacles to trigger change  Achieving/Rewarding goals met  Positive attitude  Comments: Reinforced importance continuing to modify lifestyle patterns and behaviors to promote weight loss and long term weight maintenance     Comments:  During today's lesson discussed post-operative key diet principles and reviewed dietary and behavior changes to begin making now. Pt completed Bariatric-specific nutrition questionnaire. RD reviewed answers and provided feedback on responses that align with bariatric recommendations to behavior changes. Provided feedback on recommendations, areas for improvement, and provided positive feedback on areas where pt is making positive behavior changes. Pt questionnaire is included in chart separate from this note. All current stated pt questions answered. Physical Activity/Exercise    Discussed Perceived Compliance  Motivation    Patient has been educated on the importance of started physical activity regimen, reinforced the importance of regular physical activity for total health and weight management. Suggested starting exercise regimen of cardiovascular and resistance training for at least 3-5 days per week for 30-60 minutes, patient was receptive to recommendation. Goals:   1. Work to increase to 3-4 small meals per day, with planned snacks as needed. Recommend following plate method for meal planning - focusing on lean protein, non-starchy vegetables, and measured amounts of starch. - Goal of  g protein and  g carbohydrate per day.                - Recommend continuing protein supplement as meal replacement at least 1x/day OR as high protein snack option  2. Increase non caloric fluid to 64 oz per day. Eliminate caffeine, added sugar, carbonation, and straws.               -Continue to work to decrease sugar sweetened beverages - goal of calorie free beverages only              -Must eliminate caffeine prior to surgery and avoid for ~6-8 weeks   -Practice 30:30 rule,  food and flood   3. Start activity regimen, work to increase ADL  4. Start Complete MVI    Candidate for surgery (per RD):  PENDING

## 2021-07-14 ENCOUNTER — OFFICE VISIT (OUTPATIENT)
Dept: SURGERY | Age: 41
End: 2021-07-14

## 2021-07-14 ENCOUNTER — OFFICE VISIT (OUTPATIENT)
Dept: SURGERY | Age: 41
End: 2021-07-14
Payer: MEDICAID

## 2021-07-14 VITALS
WEIGHT: 315 LBS | BODY MASS INDEX: 44.1 KG/M2 | HEIGHT: 71 IN | DIASTOLIC BLOOD PRESSURE: 68 MMHG | HEART RATE: 82 BPM | SYSTOLIC BLOOD PRESSURE: 138 MMHG | OXYGEN SATURATION: 98 %

## 2021-07-14 DIAGNOSIS — E66.01 MORBID OBESITY (HCC): ICD-10-CM

## 2021-07-14 DIAGNOSIS — I10 ESSENTIAL HYPERTENSION: Primary | ICD-10-CM

## 2021-07-14 DIAGNOSIS — G93.2 PSEUDOTUMOR CEREBRI: ICD-10-CM

## 2021-07-14 DIAGNOSIS — E66.01 MORBID OBESITY WITH BMI OF 70 AND OVER, ADULT (HCC): ICD-10-CM

## 2021-07-14 DIAGNOSIS — E66.01 MORBID OBESITY (HCC): Primary | ICD-10-CM

## 2021-07-14 DIAGNOSIS — F14.90 COCAINE USE: ICD-10-CM

## 2021-07-14 DIAGNOSIS — J45.909 UNCOMPLICATED ASTHMA, UNSPECIFIED ASTHMA SEVERITY, UNSPECIFIED WHETHER PERSISTENT: ICD-10-CM

## 2021-07-14 DIAGNOSIS — F12.90 MARIJUANA USE: ICD-10-CM

## 2021-07-14 DIAGNOSIS — G47.30 SLEEP DISORDER BREATHING: ICD-10-CM

## 2021-07-14 PROCEDURE — 99214 OFFICE O/P EST MOD 30 MIN: CPT | Performed by: SPECIALIST

## 2021-07-14 NOTE — PATIENT INSTRUCTIONS

## 2021-07-14 NOTE — PROGRESS NOTES
Pre-Operative Progress Consultation    Subjective:     Alexander Matias is a 35 y.o. obese male with a Body mass index is 74.88 kg/m². .  he desires surgery at this time because of health issues and quality of life issues. Alexander Matias has tried multiple diets in his lifetime most recently tried physician supervised, behavior modification and unsupervised diets. He was seen for consultation in May after leaving the process in Dr. Abdirahman Bond. At his May consult the following statement was made; The patient has expectations related to his surgery which are impossible to achieve at his current weight. I spent a considerable amount of time discussing his very significant health issues that have gone on for quite some time. I have also explained to him that it would be impossible even to have him tolerate an anesthesia at his current size. I have also explained to him that he must have a sleep study to obtain the diagnosis of sleep apnea and be established on his CPAP machine prior to any surgical intervention. He has pushed back quite a bit today stating that he had his gallbladder out with no issues. I explained to him that bariatric surgery is totally elective and we must obtain the diagnosis of sleep apnea and he must lose weight preoperatively in order to proceed to the operating suite to address his super morbid obese status. I explained to him that my preference would be for him to reduce his weight by at least 50 to 60 pounds prior to any attempt at weight loss surgery in order to make his surgical procedure much safer.     Bariatric comorbidities present are   Patient Active Problem List   Diagnosis Code    Biliary colic J05.28    Class 3 severe obesity due to excess calories with body mass index (BMI) greater than or equal to 70 in adult ([facility]) E66.01, Z68.45    Morbid obesity ([facility]) E66.01    Thunderclap headache G44.53    Cocaine use F14.90    Marijuana use F12.90    Hypertensive emergency requring acute intensive management I16.1    Asthma J45.909    Morbid obesity with BMI of 70 and over, adult ([facility] E66.01, Z68.45    Hypertension I10    Arthritis M19.90    Chronic pain G89.29    Pseudotumor cerebri G93.2    Sleep disorder breathing G47.30     The patient desires laparoscopic sleeve gastrectomy for surgical weight loss. Cindi Barfield is here today to check progress with weight loss / evaluate nutritional status and review all subspecialty clearances in hopes of proceeding to the operating room.      Patient Active Problem List    Diagnosis Date Noted    Morbid obesity with BMI of 70 and over, adult ([facility]     Hypertension     Arthritis     Chronic pain     Pseudotumor cerebri     Sleep disorder breathing     Morbid obesity ([facility]     Thunderclap headache     Cocaine use     Marijuana use     Hypertensive emergency requring acute intensive management     Asthma     Biliary colic 74/68/7280    Class 3 severe obesity due to excess calories with body mass index (BMI) greater than or equal to 70 in adult [facility]) 09/23/2019      Past Surgical History:   Procedure Laterality Date    HX HEENT      VOCAL CORDS AND FOLDS FOR CANCER CELLS    HX WRIST FRACTURE TX Right     DE CT [address] N/A 11/5/2020    2 Cta Head (94559) performed by Mary Jane Lopez MD at [facility] 61 N/A 11/5/2020    Lumbar Puncture Diagnostic Csf (86630) performed by Mary Jane Lopez MD at [address] CATH CAROTID/INNOM ART ANGIO INTRCRANL ART N/A 11/5/2020    Angiography Cerebral Common Carotid Right/Innominate (83298) performed by Mary Jane Lopez MD at [address] CATH CAROTID/INNOM ART ANGIO INTRCRANL ART N/A 11/5/2020    Angiography Cerebral Common Carotid Left/Innominate (95725) performed by Mary Jane Lopez MD at [address] VERTEBRAL ARTERY N/A 2020    Angiography Cervicocerebral Vertebral Right (47420) performed by Naheed Carrillo MD at [address] [facility] [address], VASCULAR ACCESS N/A 2020    Ultrasound Guided Vascular Access (11313) performed by Naheed Carrillo MD at [facility]      Social History     Tobacco Use    Smoking status: Former Smoker     Packs/day: 1.50     Quit date: 3/1/2017     Years since quittin.3    Smokeless tobacco: Current User   Substance Use Topics    Alcohol use: Not Currently      Family History   Problem Relation Age of Onset    Hypertension Mother         PULMONARY    Hypertension Father         PULMONARY      Current Outpatient Medications   Medication Sig Dispense Refill    acetaZOLAMIDE SR (DIAMOX) 500 mg capsule acetazolamide  mg capsule,extended release   TAKE 1 CAPSULE BY MOUTH TWICE DAILY FOR 30 DAYS      verapamiL (CALAN) 40 mg tablet Take 1 Tab by mouth two (2) times a day. 60 Tab 2    citalopram (CeleXA) 10 mg tablet Take  by mouth daily.  liraglutide (VICTOZA) 0.6 mg/0.1 mL (18 mg/3 mL) pnij 0.6 mg by SubCUTAneous route.  budesonide-formoterol (SYMBICORT) 80-4.5 mcg/actuation HFAA Take 2 Puffs by inhalation two (2) times a day.  albuterol (PROVENTIL HFA, VENTOLIN HFA, PROAIR HFA) 90 mcg/actuation inhaler Take  by inhalation two (2) times daily as needed for Wheezing.  gabapentin (NEURONTIN) 800 mg tablet Take 800 mg by mouth four (4) times daily.  meloxicam (MOBIC) 7.5 mg tablet Take 7.5 mg by mouth two (2) times a day. Indications: joint damage causing pain and loss of function, rheumatoid arthritis      aspirin delayed-release 81 mg tablet Take 81 mg by mouth daily.        Allergies   Allergen Reactions    Bactrim [Sulfamethoprim] Hives          Review of Systems:        General - No history or complaints of unexpected fever, chills, or weight loss  Head/Neck - No history or complaints of headache, diplopia, dysphagia, hearing loss  Cardiac - No history or complaints of chest pain, palpitations, murmur, or shortness of breath  Pulmonary - No history or complaints of shortness of breath, productive cough, hemoptysis  Gastrointestinal - No history or complaints of reflux,  abdominal pain, obstipation/constipation, blood per rectum  Genitourinary - No history or complaints of hematuria/dysuria, stress urinary incontinence symptoms, or renal lithiasis  Musculoskeletal - No history or complaints of joint pain or muscular weakness  Hematologic - No history or complaints of bleeding disorders, blood transfusions, sickle cell anemia  Neurologic - No history or complaints of  migraine headaches, seizure activity, syncopal episodes, TIA or stroke  Integumentary - No history or complaints of rashes, abnormal nevi, skin cancer  Gynecological - No history of heavy menses/abnormal menses    Objective:     Visit Vitals  /68   Pulse 82   Ht 5' 11\" (1.803 m)   Wt (!) 243.5 kg (536 lb 14.4 oz)   SpO2 98%   BMI 74.88 kg/m²     Physical Examination: General appearance -generally patient looks somewhat out of breath.   He is also very loud when he speaks  Mental status - alert, oriented to person, place, and time, agitated  Eyes - pupils equal and reactive, extraocular eye movements intact, sclera anicteric, left eye normal, right eye normal  Ears - right ear normal, left ear normal  Nose - normal and patent, no erythema, discharge or polyps  Mouth - mucous membranes moist, pharynx normal without lesions  Neck - supple, no significant adenopathy, massive neck circumference is noted  Lymphatics - no palpable lymphadenopathy, no hepatosplenomegaly  Chest - clear to auscultation,  very distant breath sounds noted, the patient became very short of breath when lying flat  Heart - normal rate, regular rhythm, normal S1, S2, no murmurs, rubs, clicks or gallops  Abdomen -massive, severe, central obesity noted with total inability to palpate rib margins. Back exam - full range of motion, no tenderness, palpable spasm or pain on motion  Neurological - alert, oriented, normal speech, no focal findings or movement disorder noted  Musculoskeletal - no joint tenderness, deformity or swelling  Extremities - peripheral pulses normal, moderate edema with venous stasis changes, no clubbing or cyanosis  Skin - normal coloration and turgor, no rashes, no suspicious skin lesions noted    Labs:             Assessment:     Morbid obesity with associated comorbidity     Plan:     Continuation of Pre-Operative evaluation / clearance. Torrance Severance has returned to the office today to discuss his status as a surgical candidate. his progress has been noted and reviewed. We will continue the pre-operative process and work towards goals as outlined. he has 52 more pounds to lose before proceeding to the OR. (3 pounds lost since initial consult visit 2 months ago)  he has several more nutritional visits to complete before proceeding to the OR  he has an outstanding several clearance to review before proceeding to the OR. Torrance Severance understand the rationales for all the above. It has been discussed that given his obese condition that the best surgical option for this patient would be the laparoscopic sleeve gastrectomy. Torrance Severance agrees with the surgical choice and has been educated in it's; risks, benefits, and alternatives. We will continue with the pre-operative evaluation as needed to check progress. The patient understands the plan of action        Secondary Diagnoses:     Dietary Intervention  - The patient is currently scheduled to see or has been followed by a bariatric nutritionist for an attempt at preoperative weight loss as has been dictated by their insurance carrier.   They will be assessed at various times during their follow up to evaluate their progress depending on the length of time that is required once again by their carrier. I have explained the importance of preoperative weight loss and the benefits regarding lower surgical risk and also assisting the patient in reaching their weight loss goal.  Finally they understand their is a physiologic benefit from the standpoint of hepatic volume reduction preoperatively.   I have reiterated the importance of a low carbohydrate and high protein regimen to achieve their stated goal.      Signed By: Kayla Petersen MD     July 14, 2021

## 2021-07-16 VITALS — WEIGHT: 315 LBS | HEIGHT: 71 IN | BODY MASS INDEX: 44.1 KG/M2

## 2021-07-16 NOTE — PROGRESS NOTES
Medical Weight Loss Multi-Disciplinary Program    Name: Jim Contreras   : 1980    Session# 6, Pt attended in-person class. Weight obtained in office. Date: 2021    Visit Vitals  Ht 5' 11\" (1.803 m)   Wt (!) 243.5 kg (536 lb 14.4 oz)   BMI 74.88 kg/m²       Pt has GAINED 7.1 lbs since last nutrition visit on 21. Pt has lost 2.1 lbs since initial consult on 21. Pt has a pre-operative WLG = 50-60 lbs    Dietary Instructions    Reviewed intake  Understanding low carbohydrates, low sugar, higher protein meals  Instruction given for personal dietary changes  Discussed perceived compliance  Comments: RD Reviewed Diet History and Physical Activity/Exercise habits. Recommended dietary changes discussed for both before and after surgery. Reviewed recommendation to follow 6311-6768 calorie diet, working to reduce total carbohydrate intake to  g or less per day and increasing protein intake to  g per day, compared current intake to recommendations. Recommend pt adopt an exercise routine of at least 3-5 days a week for at least 30 minutes/day. If pt unable to participate in walking, Amarilys, swimming, or other exercises, recommend pt work with a physical therapist and/or participate in chair exercises, yoga, and/or increase activities of daily living as able. Patient participated in pre-recorded education class video. Recorded video of dietitian discussing key diet principles and reviewing recommendations preparing for bariatric surgery. Reviewed diet progression guide with portions following surgery, discussed week 1-2 liquid diet. Reinforced importance of adequate hydration and protein intake. Discussed appropriate choices for the liquid diet and modifications regarding sugar free beverages, carbonation, caffeine, utilization of straws and elimination of alcohol. Reviewed daily schedule, shopping list, and behavior modifications following surgery.    Patient received pre-operative education packet that reviewed these topics, but discussed in details the role of dietary and behavior changes to promote optimal long term weight loss following bariatric surgery. Patient watched the video in its entirety and turned in the 3 embedded passcodes from the video session. Pt submitted \"homework\" for today's education class. Pt completed 24-hour recall along with provided a physical activity log. Pt submitted nutrition, exercise, and behavior goals that they plan to make until surgery and after surgery. Behavior Modification    Identify obstacles to trigger change  Achieving/Rewarding goals met  Positive attitude  Comments: Reinforced importance continuing to modify lifestyle patterns and behaviors to promote weight loss and long term weight maintenance. During today's lesson discussed post-operative key diet principles and reviewed dietary and behavior changes to begin making now     Comments: Provided opportunity for patient to ask any last-minute questions prior to finishing nutrition visits. Provided contact information for additional questions as they arise. Pt completed Bariatric-specific nutrition questionnaire. RD reviewed answers and provided feedback on responses that align with bariatric recommendations to behavior changes. Provided feedback on recommendations, areas for improvement, and provided positive feedback on areas where pt is making positive behavior changes. Pt questionnaire is included in chart separate from this note. All current stated pt questions answered. Physical Activity/Exercise    Discussed Perceived Compliance  Motivation    Patient has been educated on the importance of started physical activity regimen, reinforced the importance of regular physical activity for total health and weight management.   Suggested starting exercise regimen of cardiovascular and resistance training for at least 3-5 days per week for 30-60 minutes, patient was receptive to recommendation. Goals:   1. Work to increase to 3-4 small meals per day, with planned snacks as needed. Recommend following plate method for meal planning - focusing on lean protein, non-starchy vegetables, and measured amounts of starch. - Goal of  g protein and  g carbohydrate per day. - Recommend continuing protein supplement as meal replacement at least 1x/day OR as high protein snack option  2. Increase non caloric fluid to 64 oz per day. Eliminate caffeine, added sugar, carbonation, and straws.               -Continue to work to decrease sugar sweetened beverages - goal of calorie free beverages only              -Must eliminate caffeine prior to surgery and avoid for ~6-8 weeks   -Practice 30:30 rule,  food and flood   3. Start activity regimen, work to increase ADL  4. Start Complete MVI    Candidate for surgery (per RD): PENDING - Pt has fulfilled nutrition education requirements set by insurance. Pt demonstrates good understanding of post-op dietary principles such as eating 3 meals/day and focusing on lean protein and non-starchy vegetables at meals. Pt requires continued nutrition counseling in an effort to assist pt with pre-op WLG and diet adequacy and compliance. Pt scheduled to come in for one-on-one dietary intervention on 7/22/21. Pt did not voice or notate any questions at this time.

## 2021-08-19 ENCOUNTER — CLINICAL SUPPORT (OUTPATIENT)
Dept: SURGERY | Age: 41
End: 2021-08-19

## 2021-08-19 VITALS — BODY MASS INDEX: 44.1 KG/M2 | HEIGHT: 71 IN | WEIGHT: 315 LBS

## 2021-08-19 DIAGNOSIS — E66.01 MORBID OBESITY (HCC): Primary | ICD-10-CM

## 2021-08-19 NOTE — PROGRESS NOTES
Patient's Name: Tavia Guillen   Age: 35 y.o. YOB: 1980   Sex: male    Date:   8/19/2021      Visit Vitals   5' 11\" (1.803 m)   Wt (!) 242.2 kg (534 lb)   BMI 74.48 kg/m²       Pounds Lost since last month: 2.9 lbs               Pounds Gained since last month: N/A    Starting Weight: 539 lbs   Previous Months Weight: 536.9  Overall Pounds Lost: 5 lbs since 5/19/21 initial bariatric surgery appt with Dr Garima Ledezma, however note from NP Rubina Zhou 6/17/2021 progress note that pt had lost a total of 33 lbs on that date from initial consult with Dr Melodye Mcburney at [address] states that his pre-consult weight in [place] was 572 lbs (2/2021)     Overall Pounds Gained: 0      Do you smoke? no    Alcohol intake:  Number of drinks at a time:  None, \"not for years\"  Number of times a week: N/A      Eating Habits and Behaviors    I reviewed Nutrition, Behavior, and Exercise changes to start working on. Some of the eating behaviors that we discussed included:  Monitoring calories, fat, carbohydrates and protein through food journal.       Patient is encouraged to aim for 64 ounces of sugar-free, caffeine-free, and carbonation-free fluid per day , preferably from water, but also sugar free-beverages such as Crystal Light. We also talked about eating out options that are healthier. Patient was encouraged to always request sauces and dressings on the side and request a salad, broth-based soup, or vegetables in place of fries.       Patient's current diet habits include: 3 meals per day with no snacks  7:00 am - UP  7:30-7:40 am-BREAKFAST: Egg beaters (1 c) with 1/4 cup non-starchy vegetables, 2 [place] sausage  8:00- 32 oz Iced Coffee (black)  3:00pm-LUNCH: Wrap (2 low carb wraps with 8 slices of chicken deli meat, 1/4 c cheese, tomato, onion, lite guzman)  7:00-7:15pm-DINNER: Healthy Choice Dinner (Variety)  10:00 pm-BED  DESSERT: none  SNACKS: none  FLUIDS: 120 oz/day - Protein supplement intake: \"not a lot\", have tried them, like Fairlife, and Premier protein shakes. Physical Activity/Exercise    Comments: We talked about exercise. Patient was given reasons of why exercise is so important and how that can help with their long-term success. Discussed goal of 150 min of vigorous activity/wk and to include purposeful activity, such as parking far in the grocery store lot, etc. I have encouraged patient to get a support system to help with the activity. Currently for activity, patient is: Chair exercises, hand weights (5 & 3 lb), \"get my cardiovascular in\", \"Usually do this for 10 min/ time, 2x/day, 3-4 x/wk. Behavior Modification       Comments: We also talked about behavior modifications. We talked about eating triggers, such as eating in front of the TV and solutions, such as making the TV a no eating zone. If patient is eating out of emotion, food will only temporarily solve that. Patient is encouraged to HALT and assess if they are eating because they are Hungry, or out of emotions: Anxious, Lonely, Tired, which the food will only temporarily solve. We also talked about ways to prevent relapse. Goals that patient wants to work on include:  1. Monitor carbohydrates through food journal, limit to no more than 50g/day. 2. Do not skip meals, consume a meal or high protein snack every 3-4 hrs while awake, with first meal within 2 hrs of waking. 3. Increase exercise, as tolerated, to 30 min/day x 5d/wk. Work at increasing frequency of exercise to 5 d/wk first, and then increase time/day on those days. Handouts Provided:     [x] Bariatric Surgery Criteria Packet  [] \"Nut for Nuts? Be careful! \"  [] Meal Guide Handout  [] Carb Counting  [] Protein content of Foods  [] Snack Suggestions  [] BD Starches Packet  [] Protein Chart  [] Post-op Education Packet  [] Overview of Vitamins & Minerals TIMELINE  [] Other  [] None    Pt scheduled for follow-up nutrition consultation on 9/23/2021 to assess wt loss, and diet adequacy.     Poncho Gambino Mayank [address] RD  8/19/2021

## 2021-09-23 ENCOUNTER — CLINICAL SUPPORT (OUTPATIENT)
Dept: SURGERY | Age: 41
End: 2021-09-23

## 2021-09-23 VITALS — HEIGHT: 71 IN | BODY MASS INDEX: 44.1 KG/M2 | WEIGHT: 315 LBS

## 2021-09-23 DIAGNOSIS — E66.01 MORBID OBESITY (HCC): Primary | ICD-10-CM

## 2021-09-23 NOTE — PROGRESS NOTES
Patient's Name: Josh Fortune   Age: 35 y.o. YOB: 1980   Sex: male    Date:   9/23/2021      Visit Vitals   5' 11\" (1.803 m)   Wt (!) 240.8 kg (530 lb 12.8 oz)   BMI 74.03 kg/m²       Pounds Lost since last month: 3.2 lbs               Pounds Gained since last month: 0.0    Do you smoke? No    Alcohol intake:  Number of drinks at a time:  none  Number of times a week: n/a    This was a brief telephonic virtual visit as pt was unable to come into the clinic due to a work conflict and the SBELU-03 pandemic restrictions. Weight verified through photographic evidence from pt of their home weight scale. Eating Habits and Behaviors    Reviewed pts current dietary habits and changes made during the past month. Encouraged pt to maintain his current intake of fluids and protein, while he is watching his carbohydrate intake and calories. Patient's current diet habits include: Pt states that he has cut out breakfast and is using a Protein shake, lunch is protein shake and yogurt, and dinner is a pre-prepared meal.      FLUIDS: 192 oz/day of water or water with SF [place]    Protein supplement intake: 2 Premier protein shakes/day      Physical Activity/Exercise    Comments: We discussed pts current exercise and the importance of physical activity on weight loss goals. Currently for activity, patient is: Pt is working now (full time past 2 wks) and therefore is walking now, \"I'm doing way more walking and moving than he has done in years\". States his muscles are hurting, so he \"knows that he is building muscle\". Does that 6 d/wk. Goals that patient wants to work on include:  1. Maintain VLCD with protein supplements, ensuring that he is meeting his protein goal of  g/day. 2. Pt has a pre-op weight goal of 522 lbs. 3. Maintain exercise goal of 150 min vigorous activtiy/wk. Handouts Provided:     [] Bariatric Surgery Criteria Packet  [] \"Nut for Nuts? Be careful! \"  [] Meal Guide Handout  [] Carb Counting  [] Protein content of Foods  [] Snack Suggestions  [] BD Starches Packet  [] Protein Chart  [] Post-op Education Packet  [] Overview of Vitamins & Minerals TIMELINE  [] Other  [x] None    Pt is scheduled to come in for his pre-op appointment on 11/8/21 with Dr Cahrmaine Kimball, pt has a nutrition weight evaluation scheduled for 11/3/2021 in preparation for this pre-op provider visit.     Meliza Carter, MS RD/LD  9/23/2021

## 2021-11-04 ENCOUNTER — DOCUMENTATION ONLY (OUTPATIENT)
Dept: SURGERY | Age: 41
End: 2021-11-04

## 2021-11-04 NOTE — PROGRESS NOTES
Pt had a scheduled appointment with me on 11/3/2021 to which he did not attend. Pt returned my phone call this morning and reports that although he has not been weighing himself, he feels that he is stuck at 530 lbs. Reviewed pts dietary intake:  Breakfast: Pt is currently eating a protein shake for breakfast  Lunch: Chicken pouch (2 oz) and 1 stick string cheese  Dinner: Healthy Choice Meal  Fluids: non-caloric      Discussed with pt that he needs to ensure that he is consuming protein at each meal and snack, and that he has a high quality protein Q 3-4 hrs while awake. Encouraged pt to [facility] a food diary maxim such as [employer] or MyFitness Pal to log his PO intake and ensure that he is consuming 1,000-1,200 kcal daily. Discussed options for any between meal snacks that are low calorie, and high protein. Pt has an in-office appt with Dr Jayden Fonseca on 11/8/2021. Pt has my contact information and I encouraged him to call me with any future nutrition-related questions. Pt to follow-up with me PRN for nutrition goals/pre-op WLG.     RD: Anoop Leon, MS, RD/LD

## 2021-11-08 ENCOUNTER — OFFICE VISIT (OUTPATIENT)
Dept: SURGERY | Age: 41
End: 2021-11-08
Payer: MEDICAID

## 2021-11-08 VITALS
RESPIRATION RATE: 16 BRPM | WEIGHT: 315 LBS | HEIGHT: 71 IN | DIASTOLIC BLOOD PRESSURE: 83 MMHG | TEMPERATURE: 97.3 F | HEART RATE: 103 BPM | OXYGEN SATURATION: 97 % | BODY MASS INDEX: 44.1 KG/M2 | SYSTOLIC BLOOD PRESSURE: 145 MMHG

## 2021-11-08 DIAGNOSIS — E66.01 MORBID OBESITY WITH BMI OF 70 AND OVER, ADULT (HCC): Primary | ICD-10-CM

## 2021-11-08 DIAGNOSIS — I10 PRIMARY HYPERTENSION: ICD-10-CM

## 2021-11-08 DIAGNOSIS — M19.90 ARTHRITIS: ICD-10-CM

## 2021-11-08 DIAGNOSIS — E66.01 MORBID OBESITY (HCC): ICD-10-CM

## 2021-11-08 DIAGNOSIS — G89.29 OTHER CHRONIC PAIN: ICD-10-CM

## 2021-11-08 DIAGNOSIS — J45.909 UNCOMPLICATED ASTHMA, UNSPECIFIED ASTHMA SEVERITY, UNSPECIFIED WHETHER PERSISTENT: ICD-10-CM

## 2021-11-08 DIAGNOSIS — G93.2 PSEUDOTUMOR CEREBRI: ICD-10-CM

## 2021-11-08 DIAGNOSIS — G47.30 SLEEP DISORDER BREATHING: ICD-10-CM

## 2021-11-08 PROCEDURE — 99213 OFFICE O/P EST LOW 20 MIN: CPT | Performed by: SPECIALIST

## 2021-11-08 RX ORDER — OMEPRAZOLE 40 MG/1
CAPSULE, DELAYED RELEASE ORAL
COMMUNITY
Start: 2021-08-12

## 2021-11-08 RX ORDER — INSULIN DETEMIR 100 [IU]/ML
INJECTION, SOLUTION SUBCUTANEOUS
COMMUNITY

## 2021-11-08 NOTE — PROGRESS NOTES
Pre-Operative Progress Consultation  He has been in our system since May with a weight of 539 lbs    Subjective:     Nitza Sandhu is a 35 y.o. obese male with a Body mass index is 74.6 kg/m². .  he desires surgery at this time because of health issues and quality of life issues. Nitza Sandhu has tried multiple diets in his lifetime most recently tried physician supervised, behavior modification and unsupervised diets. He had a consult in May after ending his endeavor to have weight loss surgery with Dr. Marleny Raza with the following notation;     I have reviewed the patient's chart in care everywhere and he has a relationship with Dr. Marleny Raza at [facility] for bariatric surgery. Apparently Dr. Marleny Raza removed his gallbladder sometime within the past year or 2. The patient underwent a medical weight loss program at that institution and apparently had significant problems with compliance as it pertains to his follow-up. He was even released for 3 months from the program due to his noncompliance. In my evaluation of the patient's history today he was never referred for sleep study despite his massively high BMI of 76. Also his expectation is that he will be able to have a gastric bypass procedure as a first-line treatment of his morbid obesity. He was asked to lose 50 lbs at his consult and was referred to for a sleep study. Since that time he has lost 5 lbs and was seen by Dr. Yeni Rock who did not feel the need to order a sleep study as he had \"no symptoms\".     He is here today extremely frustrated with his inability to lose weight and the fact that he was given such a lofty weight loss goal.    Bariatric comorbidities present are   Patient Active Problem List   Diagnosis Code    Biliary colic D69.46    Class 3 severe obesity due to excess calories with body mass index (BMI) greater than or equal to 70 in adult ([facility] E66.01, Z68.45    Morbid obesity (HCC) E66.01    Thunderclap headache G44.53    Cocaine use F14.90    Marijuana use F12.90    Hypertensive emergency requring acute intensive management I16.1    Asthma J45.909    Morbid obesity with BMI of 70 and over, adult ([facility] E66.01, Z68.45    Hypertension I10    Arthritis M19.90    Chronic pain G89.29    Pseudotumor cerebri G93.2    Sleep disorder breathing G47.30     The patient desires 2-stage sleeve to bypass for surgical weight loss. Nena Quezada is here today to check progress with weight loss / evaluate nutritional status and review all subspecialty clearances in hopes of proceeding to the operating room.      Patient Active Problem List    Diagnosis Date Noted    Morbid obesity with BMI of 70 and over, adult ([facility]     Hypertension     Arthritis     Chronic pain     Pseudotumor cerebri     Sleep disorder breathing     Morbid obesity ([facility]     Thunderclap headache     Cocaine use     Marijuana use     Hypertensive emergency requring acute intensive management     Asthma     Biliary colic 92/24/1995    Class 3 severe obesity due to excess calories with body mass index (BMI) greater than or equal to 70 in adult [facility]) 09/23/2019      Past Surgical History:   Procedure Laterality Date    HX HEENT      VOCAL CORDS AND FOLDS FOR CANCER CELLS    HX WRIST FRACTURE TX Right     SD CT Jaanio 13 N/A 11/5/2020    2 Cta Head (41879) performed by Graciela Jimenez MD at [facility] 61 N/A 11/5/2020    Lumbar Puncture Diagnostic Csf (42408) performed by Graciela Jimenez MD at [address] CATH CAROTID/INNOM ART ANGIO INTRCRANL ART N/A 11/5/2020    Angiography Cerebral Common Carotid Right/Innominate (64941) performed by Graciela Jimenez MD at [address] CATH CAROTID/INNOM ART ANGIO INTRCRANL ART N/A 11/5/2020    Angiography Cerebral Common Carotid Left/Innominate (90530) performed by Justa Flores Guy Ayers MD at [address] [facility] CATH VERTEBRAL ART ANGIO VERTEBRAL ARTERY N/A 2020    Angiography Cervicocerebral Vertebral Right (83408) performed by Neil Aguirer MD at [address], VASCULAR ACCESS N/A 2020    Ultrasound Guided Vascular Access ([id number]) performed by Neil Aguirre MD at [facility] 44      Social History     Tobacco Use    Smoking status: Former Smoker     Packs/day: 1.50     Quit date: 3/1/2017     Years since quittin.6    Smokeless tobacco: Current User   Substance Use Topics    Alcohol use: Not Currently      Family History   Problem Relation Age of Onset    Hypertension Mother         PULMONARY    Hypertension Father         PULMONARY      Current Outpatient Medications   Medication Sig Dispense Refill    insulin detemir U-100 (Levemir FlexTouch U-100 Insuln) 100 unit/mL (3 mL) inpn by SubCUTAneous route.  omeprazole (PRILOSEC) 40 mg capsule       acetaZOLAMIDE SR (DIAMOX) 500 mg capsule acetazolamide  mg capsule,extended release   TAKE 1 CAPSULE BY MOUTH TWICE DAILY FOR 30 DAYS      verapamiL (CALAN) 40 mg tablet Take 1 Tab by mouth two (2) times a day. 60 Tab 2    citalopram (CeleXA) 10 mg tablet Take  by mouth daily.  liraglutide (VICTOZA) 0.6 mg/0.1 mL (18 mg/3 mL) pnij 0.6 mg by SubCUTAneous route.  budesonide-formoterol (SYMBICORT) 80-4.5 mcg/actuation HFAA Take 2 Puffs by inhalation two (2) times a day.  albuterol (PROVENTIL HFA, VENTOLIN HFA, PROAIR HFA) 90 mcg/actuation inhaler Take  by inhalation two (2) times daily as needed for Wheezing.  gabapentin (NEURONTIN) 800 mg tablet Take 800 mg by mouth four (4) times daily.  meloxicam (MOBIC) 7.5 mg tablet Take 7.5 mg by mouth two (2) times a day. Indications: joint damage causing pain and loss of function, rheumatoid arthritis      aspirin delayed-release 81 mg tablet Take 81 mg by mouth daily.        Allergies   Allergen Reactions    Bactrim [Sulfamethoprim] Hives          Review of Systems:        General - No history or complaints of unexpected fever, chills, or weight loss  Head/Neck - No history or complaints of headache, diplopia, dysphagia, hearing loss  Cardiac - No history or complaints of chest pain, palpitations, murmur, or shortness of breath  Pulmonary - No history or complaints of shortness of breath, productive cough, hemoptysis  Gastrointestinal - No history or complaints of reflux,  abdominal pain, obstipation/constipation, blood per rectum  Genitourinary - No history or complaints of hematuria/dysuria, stress urinary incontinence symptoms, or renal lithiasis  Musculoskeletal - No history or complaints of joint pain or muscular weakness  Hematologic - No history or complaints of bleeding disorders, blood transfusions, sickle cell anemia  Neurologic - No history or complaints of  migraine headaches, seizure activity, syncopal episodes, TIA or stroke  Integumentary - No history or complaints of rashes, abnormal nevi, skin cancer    Objective:     Visit Vitals  BP (!) 145/83   Pulse (!) 103   Temp 97.3 °F (36.3 °C)   Resp 16   Ht 5' 11\" (1.803 m)   Wt (!) 242.6 kg (534 lb 14.4 oz)   SpO2 97%   BMI 74.60 kg/m²     Physical Examination: General appearance -generally patient looks somewhat out of breath.   He is also very loud when he speaks  Mental status - alert, oriented to person, place, and time, agitated  Eyes - pupils equal and reactive, extraocular eye movements intact, sclera anicteric, left eye normal, right eye normal  Nose - normal and patent, no erythema, discharge or polyps  Mouth - mucous membranes moist, pharynx normal without lesions  Neck - supple, no significant adenopathy, massive neck circumference is noted  Lymphatics - no palpable lymphadenopathy, no hepatosplenomegaly  Chest - clear to auscultation,  very distant breath sounds noted, the patient became very short of breath when lying flat  Heart - normal rate, regular rhythm, normal S1, S2, no murmurs, rubs, clicks or gallops  Abdomen -massive, severe, central obesity noted with total inability to palpate rib margins. Back exam - full range of motion, no tenderness, palpable spasm or pain on motion  Neurological - alert, oriented, normal speech, no focal findings or movement disorder noted  Musculoskeletal - no joint tenderness, deformity or swelling  Extremities - peripheral pulses normal, moderate edema with venous stasis changes, no clubbing or cyanosis  Skin - normal coloration and turgor, no rashes, no suspicious skin lesions noted    Labs:             Assessment:     Morbid obesity with associated comorbidity     Plan:     Continuation of Pre-Operative evaluation / clearance. Dorothy Finch has returned to the office today to discuss his status as a surgical candidate. his progress has been noted and reviewed. We will continue the pre-operative process and work towards goals as outlined. he has 45 more pounds to lose before proceeding to the OR. Dorotyh Finch understand the rationales for all the above. It has been discussed that given his obese condition that the best surgical option for this patient would be the 2-stage sleeve to bypass. Dorothy Finch agrees with the surgical choice and has been educated in it's; risks, benefits, and alternatives. We will continue with the pre-operative evaluation as needed to check progress. The patient understands the plan of action    I have had a very extensive conversation with this patient on the absolute need for weight loss prior to surgery. I have tried to explain to him the difficulties with GETA in a patient with his body habitus. He seems frustrated with my explanation and states he was \"asleep\" during his lap radha in 2019. I have told him that this office would obtain his op report and anesthesia record from his surgery for review.     He has yet to have an UGI - we will plan for that later this month. I will discuss his situation with Dr. Negrita Arboleda. Secondary Diagnoses:     Dietary Intervention  - The patient is currently scheduled to see or has been followed by a bariatric nutritionist for an attempt at preoperative weight loss as has been dictated by their insurance carrier. They will be assessed at various times during their follow up to evaluate their progress depending on the length of time that is required once again by their carrier. I have explained the importance of preoperative weight loss and the benefits regarding lower surgical risk and also assisting the patient in reaching their weight loss goal.  Finally they understand their is a physiologic benefit from the standpoint of hepatic volume reduction preoperatively. I have reiterated the importance of a low carbohydrate and high protein regimen to achieve their stated goal.    Hypertension - The patient has a clear understanding of how weight loss improves hypertension as a whole, but also they understand that there is a significant genetic component to this disease process. We will monitor the patients blood pressure while in the hospital and the plan would be to continue those medications postoperatively.  If a diuretic is being used we will stop them on discharge to prevent dehydration particularly with the sleeve gastrectomy and the gastric bypass procedures.  They will be instructed to monitor their blood pressure postoperatively while at home and notify their primary care physician in the event of any significantly high or uncharacteristic readings.     Weight Related Arthritis -The patient understands the benefits that weight loss surgery can have on their arthritis but also understands that weight loss is not a guaranteed cure and relief of symptoms is often dependent on the severity of the underlying disease.  The patient also understands that traditional pharmaceutical treatments for this diagnosis are usually unavailable to post-operative weight loss patients due to the effects on the gastrointestinal tract particularly with the gastric bypass and to a lesser effect with the sleeve gastrectomy.  Any changes to the patients medication treatment will ultimately be made the patients PCP with input by our office. Restrictive Airway Disease - We will continue all of their pulmonary medications in the form of oral pills and inhalers in both the perioperative and postoperative period. They understand that their symptoms should improve with weight loss.  Any further testing related to this will be turned over to their family physician or pulmonologist.     Signed By: Emerald Galarza     November 8, 2021